# Patient Record
(demographics unavailable — no encounter records)

---

## 2025-04-03 NOTE — REVIEW OF SYSTEMS
[Recent Wt Loss (___ Lbs)] : ~T recent [unfilled] lb weight loss [SOB on Exertion] : sob on exertion [Fever] : no fever [Fatigue] : no fatigue [Recent Wt Gain (___ Lbs)] : ~T no recent weight gain [Chills] : no chills [Epistaxis] : no epistaxis [Sore Throat] : no sore throat [Nasal Congestion] : no nasal congestion [Postnasal Drip] : no postnasal drip [Dry Mouth] : no dry mouth [Sinus Problems] : no sinus problems [Cough] : no cough [Hemoptysis] : no hemoptysis [Chest Tightness] : no chest tightness [Sputum] : no sputum [Dyspnea] : no dyspnea [Wheezing] : no wheezing [Chest Discomfort] : no chest discomfort [Palpitations] : no palpitations [GERD] : no gerd [Abdominal Pain] : no abdominal pain [Nausea] : no nausea [Vomiting] : no vomiting [Dysphagia] : no dysphagia [Bleeding] : no bleeding [Chronic Pain] : no chronic pain [Rash] : no rash [Blood Transfusion] : no blood transfusion [Clotting Disorder/ Frequent bleeding] : no clotting disorder/ frequent bleeding [Diabetes] : no diabetes [Thyroid Problem] : no thyroid problem [Obesity] : no obesity [TextBox_69] : fullness   after diner

## 2025-04-03 NOTE — PHYSICAL EXAM
[IV] : Mallampati Class: IV [Normal Appearance] : normal appearance [Supple] : supple [No Neck Mass] : no neck mass [No JVD] : no jvd [Normal Rate/Rhythm] : normal rate/rhythm [Normal S1, S2] : normal s1, s2 [No Murmurs] : no murmurs [No Resp Distress] : no resp distress [No Acc Muscle Use] : no acc muscle use [Clear to Auscultation Bilaterally] : clear to auscultation bilaterally [Benign] : benign [Not Tender] : not tender [No Masses] : no masses [Soft] : soft [No Hernias] : no hernias [Normal Gait] : normal gait [Gait - Sufficient For Exercise Testing] : gait sufficient for exercise testing [No Clubbing] : no clubbing [No Edema] : no edema [No Rash] : no rash [No Motor Deficits] : no motor deficits [Normal Affect] : normal affect [TextBox_2] : pleasant male no distress no cough no sob [TextBox_11] : no lesion moist

## 2025-04-03 NOTE — HISTORY OF PRESENT ILLNESS
[Never] : never [Awakes with Dry Mouth] : awakes with dry mouth [Awakes with Headache] : awakes with headache [Snoring] : snoring [TextBox_4] : 4/3/2025  spouse     Shanthi   51y/o  male born in [place] never  ( parents second  hand smoke )   smoker excavation    dust /     fumes        one dog    here for  SOB - cardiac work up  - 8/2024 [facility]      compared to calcium score    heart size normal     8 mm thyroid nodules     interval resolution graham bibasilar   GGO     unchanged   4 mm  nodule   - h/o  covid   2022 no hospitalization    was  in bed  - h/o   hernia  surgery --   after surgery   fever 102   cxr   2/10/2024  RLL pneumonia - random   sob   -wife states   some sob   prior to  pneumonia -    [Daytime Somnolence] : denies daytime somnolence

## 2025-04-03 NOTE — ASSESSMENT
[FreeTextEntry1] : 51y/o male    never smoker  1- SOB  non specific  noticed by family only     Occupational  + excavation   dust/fume +  2- h/o  hernia  repair   complicated by RLL pneumonia improved on ct chest  3-   thyroid nodule on ct  with  FH cancer   high  4- vaccination per primary    Recommendations 1- d di santos 2-  PFT   chart reviewed and   all anger imaging reviewed

## 2025-04-03 NOTE — HISTORY OF PRESENT ILLNESS
[Never] : never [Awakes with Dry Mouth] : awakes with dry mouth [Awakes with Headache] : awakes with headache [Snoring] : snoring [TextBox_4] : 4/3/2025  spouse     Shanthi   53y/o  male born in [place] never  ( parents second  hand smoke )   smoker excavation    dust /     fumes        one dog    here for  SOB - cardiac work up  - 8/2024 [facility]      compared to calcium score    heart size normal     8 mm thyroid nodules     interval resolution graham bibasilar   GGO     unchanged   4 mm  nodule   - h/o  covid   2022 no hospitalization    was  in bed  - h/o   hernia  surgery --   after surgery   fever 102   cxr   2/10/2024  RLL pneumonia - random   sob   -wife states   some sob   prior to  pneumonia -    [Daytime Somnolence] : denies daytime somnolence

## 2025-05-13 NOTE — REASON FOR VISIT
[Follow-Up] : a follow-up visit [Shortness of Breath] : shortness of breath [Abnormal CXR/ Chest CT] : an abnormal CXR/ chest CT

## 2025-05-13 NOTE — REVIEW OF SYSTEMS
[Recent Wt Loss (___ Lbs)] : ~T recent [unfilled] lb weight loss [Fever] : no fever [Fatigue] : no fatigue [Recent Wt Gain (___ Lbs)] : ~T no recent weight gain [Chills] : no chills [Epistaxis] : no epistaxis [Sore Throat] : no sore throat [Nasal Congestion] : no nasal congestion [Postnasal Drip] : no postnasal drip [Dry Mouth] : no dry mouth [Sinus Problems] : no sinus problems [Cough] : no cough [Hemoptysis] : no hemoptysis [Chest Tightness] : no chest tightness [Sputum] : no sputum [Dyspnea] : no dyspnea [Wheezing] : no wheezing [SOB on Exertion] : no sob on exertion [Chest Discomfort] : no chest discomfort [Palpitations] : no palpitations [GERD] : no gerd [Abdominal Pain] : no abdominal pain [Nausea] : no nausea [Vomiting] : no vomiting [Dysphagia] : no dysphagia [Bleeding] : no bleeding [Chronic Pain] : no chronic pain [Rash] : no rash [Blood Transfusion] : no blood transfusion [Clotting Disorder/ Frequent bleeding] : no clotting disorder/ frequent bleeding [Diabetes] : no diabetes [Thyroid Problem] : no thyroid problem [Obesity] : no obesity [TextBox_69] : fullness   after diner

## 2025-05-13 NOTE — PROCEDURE
[FreeTextEntry1] : PFT  5/13/2025 [facility] spirometry normal    FVC  FEV1    FEV1/FVC        FEF 25-75%    78 %   TLC normal  DLCO normal    impression  low normal  FEF  25-75%  normal  TLC and DLCO

## 2025-05-13 NOTE — HISTORY OF PRESENT ILLNESS
[Never] : never [Awakes with Dry Mouth] : awakes with dry mouth [Awakes with Headache] : awakes with headache [Snoring] : snoring [TextBox_4] : 4/3/2025  spouse     Shanthi   53y/o  male born in [place] never  ( parents second  hand smoke )   smoker excavation    dust /     fumes        one dog    here for  SOB - cardiac work up  - 8/2024 [facility]      compared to calcium score    heart size normal     8 mm thyroid nodules     interval resolution graham bibasilar   GGO     unchanged   4 mm  nodule   - h/o  covid   2022 no hospitalization    was  in bed  - h/o   hernia  surgery --   after surgery   fever 102   cxr   2/10/2024  RLL pneumonia - random   sob   -wife states   some sob   prior to  pneumonia -   5/13/2025 53y/o male   never smoker       f/u abn ct nodule and sob - currently  feeling much better  no sob no cough no chest pain  - weight loss with exercise now    no issues - PFT normal  reviewed - [Daytime Somnolence] : denies daytime somnolence

## 2025-05-13 NOTE — ASSESSMENT
[FreeTextEntry1] : 51y/o male    never smoker improved overall   1-  SOB now  imrpoved   2- h/o  hernia  repair   complicated by RLL pneumonia improved on ct chest  3-  [facility] ct   8/2024   sub  4 mm nodule  thyroid nodule on ct  with  FH cancer   high  4- vaccination per primary    Recommendations 1-  ct  8/2025 2- to see endocrine  f/u one year or prn  reviewed pft  imaging again

## 2025-05-13 NOTE — PHYSICAL EXAM
[IV] : Mallampati Class: IV [Normal Appearance] : normal appearance [Supple] : supple [No Neck Mass] : no neck mass [No JVD] : no jvd [Normal Rate/Rhythm] : normal rate/rhythm [Normal S1, S2] : normal s1, s2 [No Murmurs] : no murmurs [No Resp Distress] : no resp distress [No Acc Muscle Use] : no acc muscle use [Clear to Auscultation Bilaterally] : clear to auscultation bilaterally [Benign] : benign [Not Tender] : not tender [No Masses] : no masses [Soft] : soft [No Hernias] : no hernias [Normal Bowel Sounds] : normal bowel sounds [Normal Gait] : normal gait [No Clubbing] : no clubbing [No Edema] : no edema [No Rash] : no rash [No Motor Deficits] : no motor deficits [Normal Affect] : normal affect [TextBox_2] : pleasant mal kirsty distress no cough no sob  [TextBox_11] : no lesion moist

## 2025-05-13 NOTE — ASSESSMENT
[FreeTextEntry1] : 53y/o male    never smoker improved overall   1-  SOB now  imrpoved   2- h/o  hernia  repair   complicated by RLL pneumonia improved on ct chest  3-  [facility] ct   8/2024   sub  4 mm nodule  thyroid nodule on ct  with  FH cancer   high  4- vaccination per primary    Recommendations 1-  ct  8/2025 2- to see endocrine  f/u one year or prn  reviewed pft  imaging again